# Patient Record
Sex: FEMALE | Race: WHITE | NOT HISPANIC OR LATINO | URBAN - METROPOLITAN AREA
[De-identification: names, ages, dates, MRNs, and addresses within clinical notes are randomized per-mention and may not be internally consistent; named-entity substitution may affect disease eponyms.]

---

## 2020-07-25 ENCOUNTER — TELEPHONE ENCOUNTER (OUTPATIENT)
Dept: URBAN - METROPOLITAN AREA CLINIC 13 | Facility: CLINIC | Age: 65
End: 2020-07-25

## 2020-07-25 RX ORDER — ESZOPICLONE 3 MG/1
TAKE 1 TABLET AS NEEDED TABLET, COATED ORAL
Refills: 0 | OUTPATIENT
Start: 2007-02-02 | End: 2016-11-28

## 2020-07-25 RX ORDER — IBUPROFEN 200 MG
TAKE 1 TABLET TWICE DAILY TABLET ORAL
Refills: 0 | OUTPATIENT
Start: 2016-11-28 | End: 2017-03-13

## 2020-07-25 RX ORDER — POLYETHYLENE GLYCOL 3350, SODIUM SULFATE, SODIUM CHLORIDE, POTASSIUM CHLORIDE, ASCORBIC ACID, SODIUM ASCORBATE 7.5-2.691G
TAKE 32 OZ AS DIRECTED 5:00PM THE EVENING BEFORE AND 6HR PRIOR TO PROCEDURE KIT ORAL
Qty: 1 | Refills: 0 | OUTPATIENT
Start: 2017-01-30 | End: 2017-03-13

## 2020-07-26 ENCOUNTER — TELEPHONE ENCOUNTER (OUTPATIENT)
Dept: URBAN - METROPOLITAN AREA CLINIC 13 | Facility: CLINIC | Age: 65
End: 2020-07-26

## 2020-07-26 RX ORDER — CITALOPRAM 20 MG/1
TAKE 1 TABLET DAILY TABLET ORAL
Refills: 0 | Status: ACTIVE | COMMUNITY
Start: 2016-11-15

## 2020-07-26 RX ORDER — METHYLPREDNISOLONE 4 MG/1
TABLET ORAL
Qty: 21 | Refills: 0 | Status: ACTIVE | COMMUNITY
Start: 2017-02-11

## 2020-07-26 RX ORDER — TRIMETHOPRIM 100 MG/1
TABLET ORAL
Qty: 14 | Refills: 0 | Status: ACTIVE | COMMUNITY
Start: 2016-06-21

## 2020-07-26 RX ORDER — METHENAMINE HIPPURATE 1 G/1
TAKE 1 TABLET TWICE DAILY TABLET ORAL
Refills: 0 | Status: ACTIVE | COMMUNITY
Start: 2016-11-28

## 2020-07-26 RX ORDER — PHENTERMINE AND TOPIRAMATE 7.5; 46 MG/1; MG/1
CAPSULE, EXTENDED RELEASE ORAL
Qty: 30 | Refills: 0 | Status: ACTIVE | COMMUNITY
Start: 2016-03-31

## 2020-07-26 RX ORDER — CODEINE PHOSPHATE AND GUAIFENESIN 10; 100 MG/5ML; MG/5ML
LIQUID ORAL
Qty: 180 | Refills: 0 | Status: ACTIVE | COMMUNITY
Start: 2017-02-13

## 2020-07-26 RX ORDER — IBUPROFEN 200 MG
TAKE 1 TABLET 3 TIMES DAILY AS NEEDED TABLET ORAL
Refills: 0 | Status: ACTIVE | COMMUNITY

## 2020-07-26 RX ORDER — BLOOD SUGAR DIAGNOSTIC
STRIP MISCELLANEOUS
Qty: 300 | Refills: 0 | Status: ACTIVE | COMMUNITY
Start: 2016-08-16

## 2020-07-26 RX ORDER — MINOCYCLINE HYDROCHLORIDE 100 MG/1
CAPSULE ORAL
Qty: 60 | Refills: 0 | Status: ACTIVE | COMMUNITY
Start: 2016-02-22

## 2020-07-26 RX ORDER — TRIMETHOPRIM 100 MG/1
TABLET ORAL
Qty: 14 | Refills: 0 | Status: ACTIVE | COMMUNITY
Start: 2016-09-11

## 2020-07-26 RX ORDER — METFORMIN HYDROCHLORIDE 500 MG/1
TABLET, EXTENDED RELEASE ORAL
Qty: 30 | Refills: 0 | Status: ACTIVE | COMMUNITY
Start: 2017-01-17

## 2020-07-26 RX ORDER — CELECOXIB 200 MG/1
CAPSULE ORAL
Qty: 60 | Refills: 0 | Status: ACTIVE | COMMUNITY
Start: 2017-01-23

## 2020-07-26 RX ORDER — ESTRADIOL 0.1 MG/G
INSERT 1/4 APPLICATORFUL (1GM) VAGINALLY TWICE WEEKLY CREAM VAGINAL
Refills: 0 | Status: ACTIVE | COMMUNITY
Start: 2016-11-28

## 2020-07-26 RX ORDER — MULTIVIT-MIN/FOLIC/VIT K/LYCOP 400-300MCG
TAKE 1 TABLET DAILY TABLET ORAL
Refills: 0 | Status: ACTIVE | COMMUNITY
Start: 2016-11-28

## 2020-07-26 RX ORDER — CITALOPRAM 20 MG/1
TABLET ORAL
Qty: 30 | Refills: 0 | Status: ACTIVE | COMMUNITY
Start: 2017-01-09

## 2020-07-26 RX ORDER — PSEUDOEPHEDRINE HYDROCHLORIDE 60 MG/1
TAKE 1 TABLET EVERY 4 HOURS AS NEEDED TABLET ORAL
Refills: 0 | Status: ACTIVE | COMMUNITY

## 2020-07-26 RX ORDER — ALPRAZOLAM 0.25 MG
TAKE 1 TABLET EVERY 12 HOURS AS NEEDED TABLET ORAL
Refills: 0 | Status: ACTIVE | COMMUNITY
Start: 2016-11-28

## 2020-07-26 RX ORDER — VALACYCLOVIR 1 G/1
TABLET, FILM COATED ORAL
Qty: 8 | Refills: 0 | Status: ACTIVE | COMMUNITY
Start: 2016-12-12

## 2020-07-26 RX ORDER — BLOOD-GLUCOSE METER
EACH MISCELLANEOUS
Qty: 1 | Refills: 0 | Status: ACTIVE | COMMUNITY
Start: 2016-08-16

## 2020-07-26 RX ORDER — AMOXICILLIN AND CLAVULANATE POTASSIUM 875; 125 MG/1; MG/1
TABLET, FILM COATED ORAL
Qty: 20 | Refills: 0 | Status: ACTIVE | COMMUNITY
Start: 2017-02-11

## 2020-07-26 RX ORDER — MELOXICAM 7.5 MG/1
TABLET ORAL
Qty: 90 | Refills: 0 | Status: ACTIVE | COMMUNITY
Start: 2016-11-30

## 2020-07-26 RX ORDER — CELECOXIB 50 MG/1
TAKE 1 CAPSULE DAILY AS NEEDED CAPSULE ORAL
Refills: 0 | Status: ACTIVE | COMMUNITY

## 2020-07-26 RX ORDER — LISINOPRIL 2.5 MG/1
TAKE 1 TABLET DAILY TABLET ORAL
Refills: 0 | Status: ACTIVE | COMMUNITY
Start: 2016-11-28

## 2020-07-26 RX ORDER — AMOXICILLIN AND CLAVULANATE POTASSIUM 875; 125 MG/1; MG/1
TABLET, FILM COATED ORAL
Qty: 20 | Refills: 0 | Status: ACTIVE | COMMUNITY
Start: 2017-02-22

## 2020-07-26 RX ORDER — MINOCYCLINE HYDROCHLORIDE 100 MG/1
CAPSULE ORAL
Qty: 60 | Refills: 0 | Status: ACTIVE | COMMUNITY
Start: 2017-01-23

## 2020-07-26 RX ORDER — PHENAZOPYRIDINE HYDROCHLORIDE 200 MG/1
TABLET, FILM COATED ORAL
Qty: 90 | Refills: 0 | Status: ACTIVE | COMMUNITY
Start: 2016-05-18

## 2020-07-26 RX ORDER — SULFAMETHOXAZOLE AND TRIMETHOPRIM 800; 160 MG/1; MG/1
TABLET ORAL
Qty: 20 | Refills: 0 | Status: ACTIVE | COMMUNITY
Start: 2016-05-23

## 2020-07-26 RX ORDER — ESZOPICLONE 2 MG
TAKE 1 TABLET AT BEDTIME AS NEEDED TABLET ORAL
Refills: 0 | Status: ACTIVE | COMMUNITY
Start: 2016-11-28

## 2020-07-26 RX ORDER — METFORMIN HYDROCHLORIDE 500 MG/1
TAKE 1 TABLET DAILY TABLET, COATED ORAL
Refills: 0 | Status: ACTIVE | COMMUNITY

## 2020-07-26 RX ORDER — METHYLPREDNISOLONE 4 MG/1
TABLET ORAL
Qty: 21 | Refills: 0 | Status: ACTIVE | COMMUNITY
Start: 2017-02-22

## 2020-07-26 RX ORDER — NITROFURANTOIN MONOHYDRATE/MACROCRYSTALLINE 25; 75 MG/1; MG/1
CAPSULE ORAL
Qty: 14 | Refills: 0 | Status: ACTIVE | COMMUNITY
Start: 2016-03-18

## 2020-07-26 RX ORDER — AZELASTINE HCL 205.5 UG/1
SPRAY NASAL
Qty: 30 | Refills: 0 | Status: ACTIVE | COMMUNITY
Start: 2017-02-09

## 2020-11-09 ENCOUNTER — TELEPHONE ENCOUNTER (OUTPATIENT)
Dept: URBAN - METROPOLITAN AREA CLINIC 113 | Facility: CLINIC | Age: 65
End: 2020-11-09

## 2020-12-01 ENCOUNTER — LAB OUTSIDE AN ENCOUNTER (OUTPATIENT)
Dept: URBAN - METROPOLITAN AREA CLINIC 113 | Facility: CLINIC | Age: 65
End: 2020-12-01

## 2020-12-01 ENCOUNTER — OFFICE VISIT (OUTPATIENT)
Dept: URBAN - METROPOLITAN AREA CLINIC 113 | Facility: CLINIC | Age: 65
End: 2020-12-01
Payer: MEDICARE

## 2020-12-01 VITALS
TEMPERATURE: 97.7 F | SYSTOLIC BLOOD PRESSURE: 125 MMHG | RESPIRATION RATE: 18 BRPM | HEIGHT: 63 IN | HEART RATE: 102 BPM | WEIGHT: 254 LBS | BODY MASS INDEX: 45 KG/M2 | DIASTOLIC BLOOD PRESSURE: 66 MMHG

## 2020-12-01 DIAGNOSIS — K57.32 DIVERTICULITIS, COLON: ICD-10-CM

## 2020-12-01 DIAGNOSIS — Z86.010 HISTORY OF ADENOMATOUS POLYP OF COLON: ICD-10-CM

## 2020-12-01 DIAGNOSIS — K58.0 IRRITABLE BOWEL SYNDROME WITH DIARRHEA: ICD-10-CM

## 2020-12-01 PROCEDURE — G8482 FLU IMMUNIZE ORDER/ADMIN: HCPCS | Performed by: NURSE PRACTITIONER

## 2020-12-01 PROCEDURE — 99204 OFFICE O/P NEW MOD 45 MIN: CPT | Performed by: NURSE PRACTITIONER

## 2020-12-01 PROCEDURE — G8427 DOCREV CUR MEDS BY ELIG CLIN: HCPCS | Performed by: NURSE PRACTITIONER

## 2020-12-01 RX ORDER — CHOLECALCIFEROL (VITAMIN D3) 125 MCG
AS DIRECTED CAPSULE ORAL
Status: ACTIVE | COMMUNITY

## 2020-12-01 RX ORDER — ATORVASTATIN CALCIUM 10 MG/1
1 TABLET TABLET, FILM COATED ORAL ONCE A DAY
Status: ACTIVE | COMMUNITY

## 2020-12-01 RX ORDER — IBUPROFEN 200 MG/1
1 TABLET WITH FOOD OR MILK AS NEEDED TABLET, FILM COATED ORAL THREE TIMES A DAY
Status: ACTIVE | COMMUNITY

## 2020-12-01 RX ORDER — LISINOPRIL 2.5 MG/1
TAKE 1 TABLET DAILY TABLET ORAL
Refills: 0 | Status: ACTIVE | COMMUNITY
Start: 2016-11-28

## 2020-12-01 RX ORDER — METHENAMINE HIPPURATE 1000 MG/1
1 TABLET TABLET ORAL TWICE A DAY
Status: ACTIVE | COMMUNITY

## 2020-12-01 RX ORDER — OMEPRAZOLE 40 MG/1
1 CAPSULE 30 MINUTES BEFORE MORNING MEAL CAPSULE, DELAYED RELEASE ORAL ONCE A DAY
Status: ACTIVE | COMMUNITY

## 2020-12-01 RX ORDER — DICYCLOMINE HYDROCHLORIDE 10 MG/1
1 TABLET CAPSULE ORAL
Qty: 60 | Refills: 3 | OUTPATIENT
Start: 2020-12-01 | End: 2021-03-31

## 2020-12-01 RX ORDER — FAMOTIDINE 20 MG/1
1 TABLET AT BEDTIME AS NEEDED TABLET, FILM COATED ORAL ONCE A DAY
Status: ACTIVE | COMMUNITY

## 2020-12-01 RX ORDER — GUAIFENESIN 600 MG/1
1 TABLET AS NEEDED TABLET, EXTENDED RELEASE ORAL
Status: ACTIVE | COMMUNITY

## 2020-12-01 RX ORDER — SODIUM, POTASSIUM,MAG SULFATES 17.5-3.13G
354 ML SOLUTION, RECONSTITUTED, ORAL ORAL ONCE
Qty: 354 MILLILITER | Refills: 0 | OUTPATIENT

## 2020-12-01 RX ORDER — ALPRAZOLAM 0.25 MG/1
1 TABLET TABLET ORAL TWICE A DAY
Status: ACTIVE | COMMUNITY

## 2020-12-01 RX ORDER — BACILLUS COAGULANS/INULIN 1B-250 MG
AS DIRECTED CAPSULE ORAL
Status: ACTIVE | COMMUNITY

## 2020-12-01 NOTE — HPI-TODAY'S VISIT:
This is a 65-year-old female with a paternal history of colon cancer (age 78), and a personal history of diabetes, hyperlipidemia, anxiety, colon diverticulosis and adenomatous colon polyps due surveillance in March 2022 presenting for long interval follow-up after an ED visit diagnosing diverticulitis. She was last seen in March 2017 following a surveillance colonoscopy during which a tubular adenoma and 2 hyperplastic polyps were removed. She reports onset of "intense" pain in her lower abdomen in early November.  She saw her primary care provider who advised her to go to the emergency department.  She had a CT scan and was diagnosed with diverticulitis.  She was treated with cefdinir 300 mg twice a day and metronidazole 500 mg 3 times a day for 10 days.  She was also prescribed dicyclomine and promethazine.  During this episode, she had unformed, narrow stools.  Abdominal pain associated with diverticulitis has resolved.  At baseline, she reports a history of IBS that includes abdominal pain and diarrhea.  She typically has 1 or 2 bowel movements per day.  Her stools may be loose and unformed.  She has episodes of diarrhea with abdominal pain occurring intermittently possibly exacerbated by anxiety.  She has frequent bloating, gas, and abdominal noise.  She is not on a fiber supplement.  She reports dicyclomine was helpful in relieving pain.  She had nausea when she experienced diverticulitis.  This has resolved.  She denies vomiting, heartburn, or any other abdominal symptoms.  Once or twice a year, she has intense pressure in her rectum that is relieved with passing gas or stool.  If she is unable to pass gas or stool, she has worsening pain that will eventually cause nausea and vomiting.  If she is able to take Gas-X early during an episode, her symptoms will subside.  She denies any other abdominal symptoms. Labs 11/7/2020 CBC : WBC 11.73, hemoglobin 14, MCV 92.9, platelets 337.  Urinalysis normal with exception of moderate blood and trace leukocyte Estrace.  BMP normal.  LFTs normal TB 0.3, ALP 72, ALT 25, AST 19.  Lipase 19. CT of the abdomen and pelvis without contrast 11/7/2020 : Mild proximal sigmoid diverticulitis, otherwise negative study.

## 2020-12-17 ENCOUNTER — TELEPHONE ENCOUNTER (OUTPATIENT)
Dept: URBAN - METROPOLITAN AREA CLINIC 113 | Facility: CLINIC | Age: 65
End: 2020-12-17

## 2021-01-06 ENCOUNTER — OFFICE VISIT (OUTPATIENT)
Dept: URBAN - METROPOLITAN AREA SURGERY CENTER 25 | Facility: SURGERY CENTER | Age: 66
End: 2021-01-06

## 2021-01-19 ENCOUNTER — WEB ENCOUNTER (OUTPATIENT)
Dept: URBAN - METROPOLITAN AREA CLINIC 113 | Facility: CLINIC | Age: 66
End: 2021-01-19

## 2021-01-20 ENCOUNTER — OFFICE VISIT (OUTPATIENT)
Dept: URBAN - METROPOLITAN AREA SURGERY CENTER 25 | Facility: SURGERY CENTER | Age: 66
End: 2021-01-20
Payer: MEDICARE

## 2021-01-20 DIAGNOSIS — Z87.19 H/O DIVERTICULITIS OF COLON: ICD-10-CM

## 2021-01-20 DIAGNOSIS — Z86.010 H/O ADENOMATOUS POLYP OF COLON: ICD-10-CM

## 2021-01-20 DIAGNOSIS — R93.3 ABN FINDINGS-GI TRACT: ICD-10-CM

## 2021-01-20 DIAGNOSIS — Z09 ENCOUNTER FOR COLONOSCOPY FOLLOWING COLON POLYP REMOVAL: ICD-10-CM

## 2021-01-20 PROCEDURE — G8907 PT DOC NO EVENTS ON DISCHARG: HCPCS | Performed by: INTERNAL MEDICINE

## 2021-01-20 PROCEDURE — 45378 DIAGNOSTIC COLONOSCOPY: CPT | Performed by: INTERNAL MEDICINE

## 2021-01-20 RX ORDER — IBUPROFEN 200 MG/1
1 TABLET WITH FOOD OR MILK AS NEEDED TABLET, FILM COATED ORAL THREE TIMES A DAY
Status: ACTIVE | COMMUNITY

## 2021-01-20 RX ORDER — GUAIFENESIN 600 MG/1
1 TABLET AS NEEDED TABLET, EXTENDED RELEASE ORAL
Status: ACTIVE | COMMUNITY

## 2021-01-20 RX ORDER — METHENAMINE HIPPURATE 1000 MG/1
1 TABLET TABLET ORAL TWICE A DAY
Status: ACTIVE | COMMUNITY

## 2021-01-20 RX ORDER — FAMOTIDINE 20 MG/1
1 TABLET AT BEDTIME AS NEEDED TABLET, FILM COATED ORAL ONCE A DAY
Status: ACTIVE | COMMUNITY

## 2021-01-20 RX ORDER — BACILLUS COAGULANS/INULIN 1B-250 MG
AS DIRECTED CAPSULE ORAL
Status: ACTIVE | COMMUNITY

## 2021-01-20 RX ORDER — LISINOPRIL 2.5 MG/1
TAKE 1 TABLET DAILY TABLET ORAL
Refills: 0 | Status: ACTIVE | COMMUNITY
Start: 2016-11-28

## 2021-01-20 RX ORDER — DICYCLOMINE HYDROCHLORIDE 10 MG/1
1 TABLET CAPSULE ORAL
Qty: 60 | Refills: 3 | Status: ACTIVE | COMMUNITY
Start: 2020-12-01 | End: 2021-03-31

## 2021-01-20 RX ORDER — ATORVASTATIN CALCIUM 10 MG/1
1 TABLET TABLET, FILM COATED ORAL ONCE A DAY
Status: ACTIVE | COMMUNITY

## 2021-01-20 RX ORDER — ALPRAZOLAM 0.25 MG/1
1 TABLET TABLET ORAL TWICE A DAY
Status: ACTIVE | COMMUNITY

## 2021-01-20 RX ORDER — OMEPRAZOLE 40 MG/1
1 CAPSULE 30 MINUTES BEFORE MORNING MEAL CAPSULE, DELAYED RELEASE ORAL ONCE A DAY
Status: ACTIVE | COMMUNITY

## 2021-01-20 RX ORDER — SODIUM, POTASSIUM,MAG SULFATES 17.5-3.13G
354 ML SOLUTION, RECONSTITUTED, ORAL ORAL ONCE
Qty: 354 MILLILITER | Refills: 0 | Status: ACTIVE | COMMUNITY

## 2021-01-20 RX ORDER — CHOLECALCIFEROL (VITAMIN D3) 125 MCG
AS DIRECTED CAPSULE ORAL
Status: ACTIVE | COMMUNITY

## 2021-02-16 ENCOUNTER — OFFICE VISIT (OUTPATIENT)
Dept: URBAN - METROPOLITAN AREA SURGERY CENTER 25 | Facility: SURGERY CENTER | Age: 66
End: 2021-02-16

## 2021-04-22 ENCOUNTER — WEB ENCOUNTER (OUTPATIENT)
Dept: URBAN - METROPOLITAN AREA CLINIC 113 | Facility: CLINIC | Age: 66
End: 2021-04-22

## 2021-04-22 ENCOUNTER — OFFICE VISIT (OUTPATIENT)
Dept: URBAN - METROPOLITAN AREA CLINIC 113 | Facility: CLINIC | Age: 66
End: 2021-04-22
Payer: MEDICARE

## 2021-04-22 VITALS — RESPIRATION RATE: 18 BRPM | WEIGHT: 230 LBS | BODY MASS INDEX: 40.75 KG/M2 | HEIGHT: 63 IN | TEMPERATURE: 98.1 F

## 2021-04-22 DIAGNOSIS — Z86.010 HISTORY OF ADENOMATOUS POLYP OF COLON: ICD-10-CM

## 2021-04-22 DIAGNOSIS — K58.0 IRRITABLE BOWEL SYNDROME WITH DIARRHEA: ICD-10-CM

## 2021-04-22 DIAGNOSIS — K57.32 DIVERTICULITIS, COLON: ICD-10-CM

## 2021-04-22 PROCEDURE — 99212 OFFICE O/P EST SF 10 MIN: CPT | Performed by: INTERNAL MEDICINE

## 2021-04-22 RX ORDER — BUPROPION HYDROCHLORIDE 150 MG/1
1 TABLET IN THE MORNING TABLET, FILM COATED, EXTENDED RELEASE ORAL ONCE A DAY
Status: ACTIVE | COMMUNITY

## 2021-04-22 RX ORDER — GUAIFENESIN 600 MG/1
1 TABLET AS NEEDED TABLET, EXTENDED RELEASE ORAL
Status: ACTIVE | COMMUNITY

## 2021-04-22 RX ORDER — SODIUM, POTASSIUM,MAG SULFATES 17.5-3.13G
354 ML SOLUTION, RECONSTITUTED, ORAL ORAL ONCE
Qty: 354 MILLILITER | Refills: 0 | Status: DISCONTINUED | COMMUNITY

## 2021-04-22 RX ORDER — HYDROXYZINE HYDROCHLORIDE 25 MG/1
1 TABLET AS NEEDED TABLET, FILM COATED ORAL
Status: ACTIVE | COMMUNITY

## 2021-04-22 RX ORDER — LISINOPRIL 2.5 MG/1
TAKE 1 TABLET DAILY TABLET ORAL
Refills: 0 | Status: ACTIVE | COMMUNITY
Start: 2016-11-28

## 2021-04-22 RX ORDER — CHOLECALCIFEROL (VITAMIN D3) 125 MCG
AS DIRECTED CAPSULE ORAL
Status: ACTIVE | COMMUNITY

## 2021-04-22 RX ORDER — IBUPROFEN 200 MG/1
1 TABLET WITH FOOD OR MILK AS NEEDED TABLET, FILM COATED ORAL THREE TIMES A DAY
Status: ACTIVE | COMMUNITY

## 2021-04-22 RX ORDER — OMEPRAZOLE 40 MG/1
1 CAPSULE 30 MINUTES BEFORE MORNING MEAL CAPSULE, DELAYED RELEASE ORAL ONCE A DAY
Status: ACTIVE | COMMUNITY

## 2021-04-22 RX ORDER — ALPRAZOLAM 0.25 MG/1
1 TABLET TABLET ORAL TWICE A DAY
Status: ACTIVE | COMMUNITY

## 2021-04-22 RX ORDER — BACILLUS COAGULANS/INULIN 1B-250 MG
AS DIRECTED CAPSULE ORAL
Status: ACTIVE | COMMUNITY

## 2021-04-22 RX ORDER — ATORVASTATIN CALCIUM 10 MG/1
1 TABLET TABLET, FILM COATED ORAL ONCE A DAY
Status: ACTIVE | COMMUNITY

## 2021-04-22 RX ORDER — FAMOTIDINE 20 MG/1
1 TABLET AT BEDTIME AS NEEDED TABLET, FILM COATED ORAL ONCE A DAY
Status: ACTIVE | COMMUNITY

## 2021-04-22 RX ORDER — METHENAMINE HIPPURATE 1000 MG/1
1 TABLET TABLET ORAL TWICE A DAY
Status: ACTIVE | COMMUNITY

## 2021-04-22 NOTE — HPI-TODAY'S VISIT:
This is a 66-year-old female with a paternal history of colon cancer (age 78), and a personal history of diabetes, hyperlipidemia, anxiety, colon diverticulosis and adenomatous colon polyps due surveillance in March 2022 presenting for  follow-up.  She was last seen here on December 1, 2020 after an ED visit diagnosing diverticulitis.  This occurred in early November.  She was seen in the emergency room, had a CT scan of the diagnosed with diverticulitis.  She was subsequently treated with antibiotics.  This resulted in complete resolution of her abdominal pain.  Labs 11/7/2020 CBC : WBC 11.73, hemoglobin 14, MCV 92.9, platelets 337.  Urinalysis normal with exception of moderate blood and trace leukocyte Estrace.  BMP normal.  LFTs normal TB 0.3, ALP 72, ALT 25, AST 19.  Lipase 19.  CT of the abdomen and pelvis without contrast 11/7/2020 : Mild proximal sigmoid diverticulitis, otherwise negative study.   She was seen in March 2017 following a surveillance colonoscopy during which a tubular adenoma and 2 hyperplastic polyps were removed.   We repeated her colonoscopy on January 20, 2021.  This showed left colon diverticulosis, but no other abnormalities.  She is not having any problems at present, as had no recurrence her abdominal pain.  She is doing relatively well overall. She denies any other abdominal symptoms.

## 2021-05-02 PROBLEM — 197125005: Status: ACTIVE | Noted: 2020-12-01

## 2021-05-04 ENCOUNTER — OFFICE VISIT (OUTPATIENT)
Dept: URBAN - METROPOLITAN AREA CLINIC 113 | Facility: CLINIC | Age: 66
End: 2021-05-04

## 2022-01-18 ENCOUNTER — WEB ENCOUNTER (OUTPATIENT)
Dept: URBAN - METROPOLITAN AREA CLINIC 113 | Facility: CLINIC | Age: 67
End: 2022-01-18

## 2022-01-18 RX ORDER — CIPROFLOXACIN HYDROCHLORIDE 500 MG/1
1 TABLET TABLET, FILM COATED ORAL
Qty: 20 | Refills: 2 | OUTPATIENT
Start: 2022-01-19 | End: 2022-02-18

## 2022-01-21 ENCOUNTER — TELEPHONE ENCOUNTER (OUTPATIENT)
Dept: URBAN - METROPOLITAN AREA CLINIC 113 | Facility: CLINIC | Age: 67
End: 2022-01-21

## 2022-01-21 ENCOUNTER — WEB ENCOUNTER (OUTPATIENT)
Dept: URBAN - METROPOLITAN AREA CLINIC 113 | Facility: CLINIC | Age: 67
End: 2022-01-21

## 2022-01-21 LAB
BASO (ABSOLUTE): 0.1
BASOS: 1
EOS (ABSOLUTE): 0.1
EOS: 1
HEMATOCRIT: 43.9
HEMATOLOGY COMMENTS:: (no result)
HEMOGLOBIN: 14.2
IMMATURE CELLS: (no result)
IMMATURE GRANS (ABS): 0
IMMATURE GRANULOCYTES: 0
LYMPHS (ABSOLUTE): 3.7
LYMPHS: 36
MCH: 28.9
MCHC: 32.3
MCV: 89
MONOCYTES(ABSOLUTE): 1.1
MONOCYTES: 11
NEUTROPHILS (ABSOLUTE): 5.3
NEUTROPHILS: 51
NRBC: (no result)
PLATELETS: 353
RBC: 4.91
RDW: 14.2
WBC: 10.2

## 2022-01-21 RX ORDER — AMOXICILLIN AND CLAVULANATE POTASSIUM 500; 125 MG/1; 1/1
1 TABLET TABLET, FILM COATED ORAL
Qty: 30 TABLET | Refills: 1 | OUTPATIENT
Start: 2022-01-22 | End: 2022-02-11

## 2022-02-03 ENCOUNTER — OFFICE VISIT (OUTPATIENT)
Dept: URBAN - METROPOLITAN AREA CLINIC 113 | Facility: CLINIC | Age: 67
End: 2022-02-03
Payer: MEDICARE

## 2022-02-03 VITALS
RESPIRATION RATE: 22 BRPM | HEIGHT: 63 IN | SYSTOLIC BLOOD PRESSURE: 135 MMHG | DIASTOLIC BLOOD PRESSURE: 77 MMHG | BODY MASS INDEX: 39.69 KG/M2 | HEART RATE: 93 BPM | TEMPERATURE: 97.8 F | WEIGHT: 224 LBS

## 2022-02-03 DIAGNOSIS — R10.84 GENERALIZED ABDOMINAL PAIN: ICD-10-CM

## 2022-02-03 DIAGNOSIS — R19.8 GLOBUS SENSATION: ICD-10-CM

## 2022-02-03 DIAGNOSIS — K57.30 COLON, DIVERTICULOSIS: ICD-10-CM

## 2022-02-03 DIAGNOSIS — R10.31 RIGHT LOWER QUADRANT ABDOMINAL PAIN: ICD-10-CM

## 2022-02-03 DIAGNOSIS — R14.2 BELCHING: ICD-10-CM

## 2022-02-03 DIAGNOSIS — K21.9 GASTROESOPHAGEAL REFLUX DISEASE, UNSPECIFIED WHETHER ESOPHAGITIS PRESENT: ICD-10-CM

## 2022-02-03 PROBLEM — 102614006: Status: ACTIVE | Noted: 2022-02-03

## 2022-02-03 PROBLEM — 733657002: Status: ACTIVE | Noted: 2022-02-03

## 2022-02-03 PROCEDURE — 99214 OFFICE O/P EST MOD 30 MIN: CPT | Performed by: NURSE PRACTITIONER

## 2022-02-03 RX ORDER — FAMOTIDINE 40 MG/1
1 TABLET AT BEDTIME AS NEEDED TABLET, FILM COATED ORAL ONCE A DAY
Qty: 30 | Refills: 6

## 2022-02-03 RX ORDER — CHOLECALCIFEROL (VITAMIN D3) 125 MCG
AS DIRECTED CAPSULE ORAL
Status: ACTIVE | COMMUNITY

## 2022-02-03 RX ORDER — ATORVASTATIN CALCIUM 10 MG/1
1 TABLET TABLET, FILM COATED ORAL ONCE A DAY
Status: ACTIVE | COMMUNITY

## 2022-02-03 RX ORDER — LISINOPRIL 2.5 MG/1
TAKE 1 TABLET DAILY TABLET ORAL
Refills: 0 | Status: ACTIVE | COMMUNITY
Start: 2016-11-28

## 2022-02-03 RX ORDER — BACILLUS COAGULANS/INULIN 1B-250 MG
AS DIRECTED CAPSULE ORAL
Status: ACTIVE | COMMUNITY

## 2022-02-03 RX ORDER — FAMOTIDINE 20 MG/1
1 TABLET AT BEDTIME AS NEEDED TABLET, FILM COATED ORAL ONCE A DAY
Status: ACTIVE | COMMUNITY

## 2022-02-03 RX ORDER — CIPROFLOXACIN HYDROCHLORIDE 500 MG/1
1 TABLET TABLET, FILM COATED ORAL
Qty: 20 | Refills: 2 | Status: ON HOLD | COMMUNITY
Start: 2022-01-19 | End: 2022-02-18

## 2022-02-03 RX ORDER — IBUPROFEN 200 MG/1
1 TABLET WITH FOOD OR MILK AS NEEDED TABLET, FILM COATED ORAL THREE TIMES A DAY
Status: ON HOLD | COMMUNITY

## 2022-02-03 RX ORDER — GUAIFENESIN 600 MG/1
1 TABLET AS NEEDED TABLET, EXTENDED RELEASE ORAL
Status: ACTIVE | COMMUNITY

## 2022-02-03 RX ORDER — AMOXICILLIN AND CLAVULANATE POTASSIUM 500; 125 MG/1; 1/1
1 TABLET TABLET, FILM COATED ORAL
Qty: 30 TABLET | Refills: 1 | Status: ON HOLD | COMMUNITY
Start: 2022-01-22 | End: 2022-02-11

## 2022-02-03 RX ORDER — BUPROPION HYDROCHLORIDE 150 MG/1
1 TABLET IN THE MORNING TABLET, FILM COATED, EXTENDED RELEASE ORAL ONCE A DAY
Status: ACTIVE | COMMUNITY

## 2022-02-03 RX ORDER — OMEPRAZOLE 40 MG/1
1 CAPSULE 30 MINUTES BEFORE MORNING MEAL CAPSULE, DELAYED RELEASE ORAL ONCE A DAY
Qty: 30 | Refills: 6

## 2022-02-03 RX ORDER — DICYCLOMINE HYDROCHLORIDE 10 MG/1
1 TABLET CAPSULE ORAL
Qty: 60 | Refills: 3 | OUTPATIENT
Start: 2022-02-03 | End: 2022-06-03

## 2022-02-03 RX ORDER — HYDROXYZINE HYDROCHLORIDE 25 MG/1
1 TABLET AS NEEDED TABLET, FILM COATED ORAL
Status: ACTIVE | COMMUNITY

## 2022-02-03 RX ORDER — ALPRAZOLAM 0.25 MG/1
1 TABLET TABLET ORAL TWICE A DAY
Status: ACTIVE | COMMUNITY

## 2022-02-03 RX ORDER — METHENAMINE HIPPURATE 1000 MG/1
1 TABLET TABLET ORAL TWICE A DAY
Status: ACTIVE | COMMUNITY

## 2022-02-03 RX ORDER — OMEPRAZOLE 40 MG/1
1 CAPSULE 30 MINUTES BEFORE MORNING MEAL CAPSULE, DELAYED RELEASE ORAL ONCE A DAY
Status: ACTIVE | COMMUNITY

## 2022-02-03 NOTE — HPI-OTHER HISTORIES
CBC 1/20/2022:WBC 10.2, hemoglobin 14.2, MCV 89, platelet 353.  Labs 11/7/2020 CBC : WBC 11.73, hemoglobin 14, MCV 92.9, platelets 337.  Urinalysis normal with exception of moderate blood and trace leukocyte Estrace.  BMP normal.  LFTs normal TB 0.3, ALP 72, ALT 25, AST 19.  Lipase 19. CT of the abdomen and pelvis without contrast 11/7/2020 : Mild proximal sigmoid diverticulitis, otherwise negative study.

## 2022-02-03 NOTE — HPI-TODAY'S VISIT:
This is a 67-year-old female with a paternal history of colon cancer (age 78) and a personal history of diabetes, hyperlipidemia, anxiety, colon diverticulosis, adenomatous colon polyps due surveillance in January 2026, and irritable bowel syndrome with diarrhea presenting for follow-up. She was last seen 5/20/21.  She had an episode of diverticulitis in November 2020.  She had a repeat colonoscopy in January 2021 showing diverticulosis and no other abnormalities.  She was doing well and denied abdominal symptoms.  She was instructed to continue a high-fiber diet.  She was to take FiberCon twice a day with 8 ounces of liquid and Imodium as needed for intermittent diarrhea. She contacted our office 1/18/2022 feeling symptoms associated with diverticulitis reported as abdominal pain, gas, and nausea.  CBC was ordered.  Cipro was prescribed but she returned a call stating that she could not take it because of a prior knee replacement.  She was prescribed Augmentin. She reports pain began 1/18/2022.  Initially, she indicates location high in the right lower quadrant quickly progressing to diffuse abdominal pain.  She states it was severe and, unlike the prior episode, she also had a sensation of "pinpricks" in her abdomen.  She had already started taking Cipro when Augmentin was prescribed.  She states her orthopedist cautioned her regarding taking Cipro as it could affect her tendons.  She completed the 10-day course of Cipro 1/31/2022.  She reports mild tenderness indicated in the right abdomen but pain has resolved.  However, she feels as though has become progressively worse as evidenced by weakness, lack of energy, dizziness, feeling unsteady, feeling "winded," increased belching" a sensation of "something constantly in my throat," loss of appetite eating 25 to 30% of normal food volume, and persistent abdominal "roaring."  Her bowels are moving daily consistent with her normal bowel pattern.  Her stools are small in volume attributed to a decrease in oral intake.  She is no longer taking fiber. She was evaluated by ENT twice in the past for pain in the left side of her throat and a sensation that her left ear is clogged.  She was informed by both physicians that she likely had acid reflux and prescribed famotidine 40 mg in the evening.  She has been taking omeprazole 20 mg and famotidine 40 mg as needed for a "sour stomach," and frequent belching.  She states she required this 2 or 3 days consecutively once a month.  Due to her current symptoms including worsening belching, she has been taking this daily for the last week.  She has infrequent episodes of regurgitation and denies dysphagia.  She had nausea with initial onset of abdominal pain but this has resolved.  She denies any other abdominal symptoms.  For the last 2 mornings, she has awakened with pain indicated in the bilateral lumbar areas that resolves as the day progresses.  She has follow-up with her primary care physician, Dr. Willis, next week and labs are ordered for her annual physical.  She reports a history of variable thyroid levels.  She chronically takes a generic probiotic.

## 2022-02-12 ENCOUNTER — TELEPHONE ENCOUNTER (OUTPATIENT)
Dept: URBAN - METROPOLITAN AREA CLINIC 113 | Facility: CLINIC | Age: 67
End: 2022-02-12

## 2022-02-14 ENCOUNTER — TELEPHONE ENCOUNTER (OUTPATIENT)
Dept: URBAN - METROPOLITAN AREA CLINIC 113 | Facility: CLINIC | Age: 67
End: 2022-02-14

## 2022-02-17 ENCOUNTER — TELEPHONE ENCOUNTER (OUTPATIENT)
Dept: URBAN - METROPOLITAN AREA CLINIC 113 | Facility: CLINIC | Age: 67
End: 2022-02-17

## 2022-02-21 ENCOUNTER — WEB ENCOUNTER (OUTPATIENT)
Dept: URBAN - METROPOLITAN AREA CLINIC 113 | Facility: CLINIC | Age: 67
End: 2022-02-21

## 2022-02-21 RX ORDER — METRONIDAZOLE 500 MG/1
1 TABLET TABLET ORAL THREE TIMES A DAY
Qty: 30 | Refills: 1 | OUTPATIENT
Start: 2022-02-22 | End: 2022-03-14

## 2022-02-22 ENCOUNTER — LAB OUTSIDE AN ENCOUNTER (OUTPATIENT)
Dept: URBAN - METROPOLITAN AREA CLINIC 113 | Facility: CLINIC | Age: 67
End: 2022-02-22

## 2022-02-23 ENCOUNTER — WEB ENCOUNTER (OUTPATIENT)
Dept: URBAN - METROPOLITAN AREA CLINIC 113 | Facility: CLINIC | Age: 67
End: 2022-02-23

## 2022-02-23 ENCOUNTER — TELEPHONE ENCOUNTER (OUTPATIENT)
Dept: URBAN - METROPOLITAN AREA CLINIC 113 | Facility: CLINIC | Age: 67
End: 2022-02-23

## 2022-02-23 PROBLEM — 111359004: Status: ACTIVE | Noted: 2020-12-01

## 2022-02-25 LAB
BASO (ABSOLUTE): 0.1
BASOS: 1
C DIFFICILE TOXINS A+B, EIA: NEGATIVE
EOS (ABSOLUTE): 0
EOS: 0
HEMATOCRIT: 44.8
HEMATOLOGY COMMENTS:: (no result)
HEMOGLOBIN: 14.7
IMMATURE CELLS: (no result)
IMMATURE GRANS (ABS): 0
IMMATURE GRANULOCYTES: 0
LYMPHS (ABSOLUTE): 3.1
LYMPHS: 30
MCH: 28.7
MCHC: 32.8
MCV: 88
MONOCYTES(ABSOLUTE): 1
MONOCYTES: 10
NEUTROPHILS (ABSOLUTE): 6
NEUTROPHILS: 59
NRBC: (no result)
PLATELETS: 363
RBC: 5.12
RDW: 14.4
WBC: 10.2

## 2022-03-03 ENCOUNTER — WEB ENCOUNTER (OUTPATIENT)
Dept: URBAN - METROPOLITAN AREA CLINIC 113 | Facility: CLINIC | Age: 67
End: 2022-03-03

## 2022-03-03 RX ORDER — FLUCONAZOLE 100 MG/1
1 TABLET TABLET ORAL
Qty: 1 | OUTPATIENT
Start: 2022-03-04 | End: 2022-03-14

## 2022-03-08 ENCOUNTER — LAB OUTSIDE AN ENCOUNTER (OUTPATIENT)
Dept: URBAN - METROPOLITAN AREA CLINIC 107 | Facility: CLINIC | Age: 67
End: 2022-03-08

## 2022-03-08 ENCOUNTER — OFFICE VISIT (OUTPATIENT)
Dept: URBAN - METROPOLITAN AREA CLINIC 107 | Facility: CLINIC | Age: 67
End: 2022-03-08
Payer: MEDICARE

## 2022-03-08 VITALS
BODY MASS INDEX: 37.92 KG/M2 | WEIGHT: 214 LBS | HEIGHT: 63 IN | DIASTOLIC BLOOD PRESSURE: 70 MMHG | TEMPERATURE: 97.8 F | RESPIRATION RATE: 18 BRPM | HEART RATE: 95 BPM | SYSTOLIC BLOOD PRESSURE: 124 MMHG

## 2022-03-08 DIAGNOSIS — R19.8 GLOBUS SENSATION: ICD-10-CM

## 2022-03-08 DIAGNOSIS — K21.9 GASTROESOPHAGEAL REFLUX DISEASE, UNSPECIFIED WHETHER ESOPHAGITIS PRESENT: ICD-10-CM

## 2022-03-08 DIAGNOSIS — R10.31 RIGHT LOWER QUADRANT ABDOMINAL PAIN: ICD-10-CM

## 2022-03-08 DIAGNOSIS — R14.2 BELCHING: ICD-10-CM

## 2022-03-08 DIAGNOSIS — R19.7 DIARRHEA, UNSPECIFIED: ICD-10-CM

## 2022-03-08 PROBLEM — 267103008: Status: ACTIVE | Noted: 2022-02-03

## 2022-03-08 PROBLEM — 301754002: Status: ACTIVE | Noted: 2022-02-03

## 2022-03-08 PROCEDURE — 99214 OFFICE O/P EST MOD 30 MIN: CPT | Performed by: NURSE PRACTITIONER

## 2022-03-08 RX ORDER — DICYCLOMINE HYDROCHLORIDE 10 MG/1
1 TABLET CAPSULE ORAL
Qty: 60 | Refills: 3 | Status: ACTIVE | COMMUNITY
Start: 2022-02-03 | End: 2022-06-03

## 2022-03-08 RX ORDER — METHENAMINE HIPPURATE 1000 MG/1
1 TABLET TABLET ORAL TWICE A DAY
Status: ACTIVE | COMMUNITY

## 2022-03-08 RX ORDER — OMEPRAZOLE 40 MG/1
1 CAPSULE 30 MINUTES BEFORE MORNING MEAL CAPSULE, DELAYED RELEASE ORAL ONCE A DAY
Qty: 30 | Refills: 6 | Status: ON HOLD | COMMUNITY

## 2022-03-08 RX ORDER — BACILLUS COAGULANS/INULIN 1B-250 MG
AS DIRECTED CAPSULE ORAL
Status: ACTIVE | COMMUNITY

## 2022-03-08 RX ORDER — BUPROPION HYDROCHLORIDE 150 MG/1
1 TABLET IN THE MORNING TABLET, FILM COATED, EXTENDED RELEASE ORAL ONCE A DAY
Status: ACTIVE | COMMUNITY

## 2022-03-08 RX ORDER — CHOLECALCIFEROL (VITAMIN D3) 125 MCG
AS DIRECTED CAPSULE ORAL
Status: ACTIVE | COMMUNITY

## 2022-03-08 RX ORDER — FAMOTIDINE 40 MG/1
1 TABLET AT BEDTIME AS NEEDED TABLET, FILM COATED ORAL ONCE A DAY
Qty: 30 | Refills: 6 | Status: ON HOLD | COMMUNITY

## 2022-03-08 RX ORDER — LISINOPRIL 2.5 MG/1
TAKE 1 TABLET DAILY TABLET ORAL
Refills: 0 | Status: ACTIVE | COMMUNITY
Start: 2016-11-28

## 2022-03-08 RX ORDER — ALPRAZOLAM 0.25 MG/1
1 TABLET TABLET ORAL TWICE A DAY
Status: ACTIVE | COMMUNITY

## 2022-03-08 RX ORDER — CITALOPRAM 20 MG/1
1 TABLET TABLET, FILM COATED ORAL ONCE A DAY
Status: ACTIVE | COMMUNITY

## 2022-03-08 RX ORDER — LEVOCETIRIZINE DIHYDROCHLORIDE 5 MG/1
1 TABLET IN THE EVENING TABLET ORAL ONCE A DAY
Status: ACTIVE | COMMUNITY

## 2022-03-08 RX ORDER — ATORVASTATIN CALCIUM 10 MG/1
1 TABLET TABLET, FILM COATED ORAL ONCE A DAY
Status: ACTIVE | COMMUNITY

## 2022-03-08 RX ORDER — GUAIFENESIN 600 MG/1
1 TABLET AS NEEDED TABLET, EXTENDED RELEASE ORAL
Status: ACTIVE | COMMUNITY

## 2022-03-08 RX ORDER — HYDROXYZINE HYDROCHLORIDE 25 MG/1
1 TABLET AS NEEDED TABLET, FILM COATED ORAL
Status: ACTIVE | COMMUNITY

## 2022-03-08 RX ORDER — FLUCONAZOLE 100 MG/1
1 TABLET TABLET ORAL
Status: ACTIVE | COMMUNITY
Start: 2022-03-04 | End: 2022-03-18

## 2022-03-08 NOTE — HPI-TODAY'S VISIT:
This is a 67-year-old female with a paternal history of colon cancer (age 78) and a personal history of diabetes, hyperlipidemia, anxiety, colon diverticulosis, adenomatous colon polyps due surveillance in 2026, and irritable bowel syndrome with diarrhea presenting for follow-up. She was last seen 2/3/2022.  She had contacted our office in mid January complaining of symptoms associated with diverticulitis.  A CBC was ordered.  There was no evidence of leukocytosis.  She had recently completed a 10-day course of Cipro.  Abdominal pain had resolved but she continued to experience tenderness high in the right lower quadrant and felt generally unwell.  She was scheduled for a CT scan.  She was to resume daily fiber and continue dicyclomine as needed.  She was instructed to begin a trial of align due to her complaint of borborygmus.  She had recently been diagnosed with acid reflux following ENT evaluation.  She reported frequent belching and occasional "sour stomach."  She was instructed to increase omeprazole to 40 mg daily and to consistently use famotidine 40 mg at bedtime.  EGD was a future consideration and, due to extra esophageal symptoms (sore throat and ear congestion), Bravo pH testing would be considered. She had a normal CT on 2/10/2022.  Due to persistent symptoms, she went to the emergency department at [facility] on 2/12/2022 and was diagnosed by CT scan with diverticulitis.  She was prescribed Cipro and Flagyl.  She was prescribed Cipro and Flagyl.  She called the office 5 days after taking antibiotics and reported that she felt unwell having excessive belching, diarrhea, fatigue, and weakness.  She had lost another 10 pounds.  She had been unable to eat.  She was on a liquid diet that included boost.  She reported a "roaring" stomach with only liquids.  A stool for C. difficile was obtained and also a CBC.  She was prescribed Flagyl 500 mg 3 times a day and instructed to take align daily. She sent a web message on 3/4/2022 reporting that she was on her ninth day of metronidazole and felt terrible.  She continued to report her stomach was roaring and she had diarrhea for 5 times a day.  She had not had a solid stool for weeks.  She reported weakness and the ability to eat small amounts of low fiber food.  She reported a sensation of solids lodging in her throat and reported that she felt "miserable."  Her office visit was rescheduled to today.  She reports 3 bowel movements yesterday.  Today, is the first day she has experienced a single solid stool.  She has experienced some gas.  She denies red blood per rectum or melena.  Nausea has been minimal recently.  She is no longer experiencing abdominal cramps but reports excessive abdominal noise intermittently.  This is also improving.  She is complaining of a significant amount of belching and a sensation that there is "something stuck" indicated in the upper portion of her esophagus.  This has been present for weeks and does not impede swallowing.  She is not no longer taking famotidine or omeprazole because neither of these medications were helpful. She states she discussed the sensation in her esophagus with Dr. Garza who prescribed Diflucan for 10 days.  She is on day 4 and her symptoms are persistent.  She reports he also discussed the possibility of an EGD. She reports feeling weak, lightheaded, fatigued, and reports a lack of energy.  She states her glucose levels have been in the 90s and her blood pressure has been stable at 130/60-70.  She feels worse in the morning and improves somewhat throughout the day.  She denies dyspnea or chest pain.

## 2022-03-08 NOTE — HPI-OTHER HISTORIES
Labs 2/23/2022:Stool for C. difficile negative.  Repeat CBC: WBC 10.2, hemoglobin 14.7, MCV 88, platelet 363. CT of the abdomen and pelvis without contrast 2/12/2022:Diverticulosis and mild diverticulitis in the distal descending colon and sigmoid colon.  Chronic benign changes (calcified plaque in the aorta and degenerative changes in the lumbar spine). CT of the abdomen and pelvis with contrast 2/10/2022:No CT evidence of acute abdominopelvic pathology.  No evidence of appendicitis, acute intestinal pathology, or acute obstructive uropathy.  No mass or free fluid. CBC 1/20/2022:WBC 10.2, hemoglobin 14.2, MCV 89, platelet 353.  Colonoscopy 1/20/2021: BB PS 8, sigmoid/descending/splenic flexure/transverse diverticulosis, moderate grade 1 nonbleeding internal hemorrhoids.  Due to her prior history of adenomas, surveillance recommended in 2026. Labs 11/7/2020 CBC : WBC 11.73, hemoglobin 14, MCV 92.9, platelets 337.  Urinalysis normal with exception of moderate blood and trace leukocyte Estrace.  BMP normal.  LFTs normal TB 0.3, ALP 72, ALT 25, AST 19.  Lipase 19. CT of the abdomen and pelvis without contrast 11/7/2020 : Mild proximal sigmoid diverticulitis, otherwise negative study.

## 2022-03-17 ENCOUNTER — OFFICE VISIT (OUTPATIENT)
Dept: URBAN - METROPOLITAN AREA CLINIC 113 | Facility: CLINIC | Age: 67
End: 2022-03-17

## 2022-03-23 ENCOUNTER — OFFICE VISIT (OUTPATIENT)
Dept: URBAN - METROPOLITAN AREA CLINIC 113 | Facility: CLINIC | Age: 67
End: 2022-03-23

## 2022-03-28 PROBLEM — 271834000: Status: ACTIVE | Noted: 2022-02-03

## 2022-04-18 ENCOUNTER — OFFICE VISIT (OUTPATIENT)
Dept: URBAN - METROPOLITAN AREA SURGERY CENTER 25 | Facility: SURGERY CENTER | Age: 67
End: 2022-04-18
Payer: MEDICARE

## 2022-04-18 DIAGNOSIS — K21.9: ICD-10-CM

## 2022-04-18 DIAGNOSIS — K31.7 POLYP OF STOMACH FUNDIC GLAND: ICD-10-CM

## 2022-04-18 PROCEDURE — G8907 PT DOC NO EVENTS ON DISCHARG: HCPCS | Performed by: INTERNAL MEDICINE

## 2022-04-18 PROCEDURE — 43239 EGD BIOPSY SINGLE/MULTIPLE: CPT | Performed by: INTERNAL MEDICINE

## 2022-04-18 RX ORDER — ALPRAZOLAM 0.25 MG/1
1 TABLET TABLET ORAL TWICE A DAY
Status: ACTIVE | COMMUNITY

## 2022-04-18 RX ORDER — LEVOCETIRIZINE DIHYDROCHLORIDE 5 MG/1
1 TABLET IN THE EVENING TABLET ORAL ONCE A DAY
Status: ACTIVE | COMMUNITY

## 2022-04-18 RX ORDER — CITALOPRAM 20 MG/1
1 TABLET TABLET, FILM COATED ORAL ONCE A DAY
Status: ACTIVE | COMMUNITY

## 2022-04-18 RX ORDER — DICYCLOMINE HYDROCHLORIDE 10 MG/1
1 TABLET CAPSULE ORAL
Qty: 60 | Refills: 3 | Status: ACTIVE | COMMUNITY
Start: 2022-02-03 | End: 2022-06-03

## 2022-04-18 RX ORDER — CHOLECALCIFEROL (VITAMIN D3) 125 MCG
AS DIRECTED CAPSULE ORAL
Status: ACTIVE | COMMUNITY

## 2022-04-18 RX ORDER — METHENAMINE HIPPURATE 1000 MG/1
1 TABLET TABLET ORAL TWICE A DAY
Status: ACTIVE | COMMUNITY

## 2022-04-18 RX ORDER — OMEPRAZOLE 40 MG/1
1 CAPSULE 30 MINUTES BEFORE MORNING MEAL CAPSULE, DELAYED RELEASE ORAL ONCE A DAY
Qty: 30 | Refills: 6 | Status: ON HOLD | COMMUNITY

## 2022-04-18 RX ORDER — HYDROXYZINE HYDROCHLORIDE 25 MG/1
1 TABLET AS NEEDED TABLET, FILM COATED ORAL
Status: ACTIVE | COMMUNITY

## 2022-04-18 RX ORDER — LISINOPRIL 2.5 MG/1
TAKE 1 TABLET DAILY TABLET ORAL
Refills: 0 | Status: ACTIVE | COMMUNITY
Start: 2016-11-28

## 2022-04-18 RX ORDER — BACILLUS COAGULANS/INULIN 1B-250 MG
AS DIRECTED CAPSULE ORAL
Status: ACTIVE | COMMUNITY

## 2022-04-18 RX ORDER — GUAIFENESIN 600 MG/1
1 TABLET AS NEEDED TABLET, EXTENDED RELEASE ORAL
Status: ACTIVE | COMMUNITY

## 2022-04-18 RX ORDER — FAMOTIDINE 40 MG/1
1 TABLET AT BEDTIME AS NEEDED TABLET, FILM COATED ORAL ONCE A DAY
Qty: 30 | Refills: 6 | Status: ON HOLD | COMMUNITY

## 2022-04-18 RX ORDER — BUPROPION HYDROCHLORIDE 150 MG/1
1 TABLET IN THE MORNING TABLET, FILM COATED, EXTENDED RELEASE ORAL ONCE A DAY
Status: ACTIVE | COMMUNITY

## 2022-04-18 RX ORDER — ATORVASTATIN CALCIUM 10 MG/1
1 TABLET TABLET, FILM COATED ORAL ONCE A DAY
Status: ACTIVE | COMMUNITY

## 2022-05-14 ENCOUNTER — WEB ENCOUNTER (OUTPATIENT)
Dept: URBAN - METROPOLITAN AREA CLINIC 113 | Facility: CLINIC | Age: 67
End: 2022-05-14

## 2022-06-09 ENCOUNTER — OFFICE VISIT (OUTPATIENT)
Dept: URBAN - METROPOLITAN AREA CLINIC 113 | Facility: CLINIC | Age: 67
End: 2022-06-09

## 2022-07-12 ENCOUNTER — CLAIMS CREATED FROM THE CLAIM WINDOW (OUTPATIENT)
Dept: URBAN - METROPOLITAN AREA CLINIC 107 | Facility: CLINIC | Age: 67
End: 2022-07-12
Payer: MEDICARE

## 2022-07-12 ENCOUNTER — DASHBOARD ENCOUNTERS (OUTPATIENT)
Age: 67
End: 2022-07-12

## 2022-07-12 VITALS
TEMPERATURE: 98.2 F | SYSTOLIC BLOOD PRESSURE: 110 MMHG | RESPIRATION RATE: 18 BRPM | HEART RATE: 78 BPM | BODY MASS INDEX: 39.87 KG/M2 | DIASTOLIC BLOOD PRESSURE: 57 MMHG | WEIGHT: 225 LBS | HEIGHT: 63 IN

## 2022-07-12 DIAGNOSIS — K21.9 GASTROESOPHAGEAL REFLUX DISEASE, UNSPECIFIED WHETHER ESOPHAGITIS PRESENT: ICD-10-CM

## 2022-07-12 DIAGNOSIS — Z86.010 HISTORY OF ADENOMATOUS POLYP OF COLON: ICD-10-CM

## 2022-07-12 DIAGNOSIS — R19.7 DIARRHEA, UNSPECIFIED: ICD-10-CM

## 2022-07-12 PROBLEM — 235595009: Status: ACTIVE | Noted: 2022-02-03

## 2022-07-12 PROBLEM — 62315008 DIARRHEA: Status: ACTIVE | Noted: 2022-03-08

## 2022-07-12 PROBLEM — 429047008: Status: ACTIVE | Noted: 2020-12-01

## 2022-07-12 PROCEDURE — 99212 OFFICE O/P EST SF 10 MIN: CPT | Performed by: NURSE PRACTITIONER

## 2022-07-12 RX ORDER — LISINOPRIL 2.5 MG/1
TAKE 1 TABLET DAILY TABLET ORAL
Refills: 0 | Status: ACTIVE | COMMUNITY
Start: 2016-11-28

## 2022-07-12 RX ORDER — GUAIFENESIN 600 MG/1
1 TABLET AS NEEDED TABLET, EXTENDED RELEASE ORAL
Status: ACTIVE | COMMUNITY

## 2022-07-12 RX ORDER — HYDROXYZINE HYDROCHLORIDE 25 MG/1
1 TABLET AS NEEDED TABLET, FILM COATED ORAL
Status: ACTIVE | COMMUNITY

## 2022-07-12 RX ORDER — BACILLUS COAGULANS/INULIN 1B-250 MG
AS DIRECTED CAPSULE ORAL
Status: ACTIVE | COMMUNITY

## 2022-07-12 RX ORDER — ATORVASTATIN CALCIUM 10 MG/1
1 TABLET TABLET, FILM COATED ORAL ONCE A DAY
Status: ACTIVE | COMMUNITY

## 2022-07-12 RX ORDER — CITALOPRAM 20 MG/1
1 TABLET TABLET, FILM COATED ORAL ONCE A DAY
Status: ACTIVE | COMMUNITY

## 2022-07-12 RX ORDER — BUPROPION HYDROCHLORIDE 150 MG/1
1 TABLET IN THE MORNING TABLET, FILM COATED, EXTENDED RELEASE ORAL ONCE A DAY
Status: ACTIVE | COMMUNITY

## 2022-07-12 RX ORDER — ALPRAZOLAM 0.25 MG/1
1 TABLET TABLET ORAL TWICE A DAY
Status: ACTIVE | COMMUNITY

## 2022-07-12 RX ORDER — CHOLECALCIFEROL (VITAMIN D3) 125 MCG
AS DIRECTED CAPSULE ORAL
Status: ACTIVE | COMMUNITY

## 2022-07-12 RX ORDER — LEVOCETIRIZINE DIHYDROCHLORIDE 5 MG/1
1 TABLET IN THE EVENING TABLET ORAL ONCE A DAY
Status: ACTIVE | COMMUNITY

## 2022-07-12 RX ORDER — METHENAMINE HIPPURATE 1000 MG/1
1 TABLET TABLET ORAL TWICE A DAY
Status: ACTIVE | COMMUNITY

## 2022-07-12 NOTE — HPI-TODAY'S VISIT:
This is a 67-year-old female with a paternal history of colon cancer) age 78) any personal history of diabetes, hyperlipidemia, anxiety, colon diverticulosis, adenomatous colon polyps due surveillance in 2026, diarrhea predominant irritable bowel syndrome, and GERD presenting for follow-up. She was last seen 3/8/2020.  She had been treated for diverticulitis in the emergency department at [facility] due to a finding of mild diverticulitis 2 days following an unremarkable CT scan.  Her symptoms were persistent.  She tested negative for C. difficile.  Persistent diarrhea was discussed to be possibly associated with postinfectious IBS.  She had experienced significant symptom improvement the day of her visit.  She was to continue FiberCon daily and probiotic.  Colonoscopy was a consideration if symptoms worsen.  She reported a significant amount of belching and described a globus sensation.  She had been treated empirically with Diflucan for possible Candida esophagitis.  Her symptoms were persistent.  She denied dysphagia and was not experiencing heartburn.  She did not benefit from famotidine and pantoprazole.  She was scheduled for an EGD.  Functional dyspepsia was a consideration. She is doing well.  She reports taking daily fiber made it "harder" to have a bowel movement.  She is taking it daily align.  She denies constipation or diarrhea.  She is doing well from a GI standpoint.  She denies heartburn, regurgitation, or dysphagia.  She is not taking PPI. She and her  are currently building a house in [facility] [place] and will be moving there permanently in a year.

## 2022-07-12 NOTE — HPI-OTHER HISTORIES
EGD 4/18/2022:Normal esophagus, single gastric polyp status post biopsy, normal duodenum status post biopsy.  Pathology: Small bowel biopsy demonstrated small bowel mucosa with no significant pathologic changes.  Gastric fundus polyp was a fundic gland polyp.